# Patient Record
Sex: MALE | Race: BLACK OR AFRICAN AMERICAN | NOT HISPANIC OR LATINO | Employment: UNEMPLOYED | URBAN - METROPOLITAN AREA
[De-identification: names, ages, dates, MRNs, and addresses within clinical notes are randomized per-mention and may not be internally consistent; named-entity substitution may affect disease eponyms.]

---

## 2017-02-10 ENCOUNTER — TRANSFERRED RECORDS (OUTPATIENT)
Dept: HEALTH INFORMATION MANAGEMENT | Facility: CLINIC | Age: 42
End: 2017-02-10

## 2017-02-20 ENCOUNTER — TRANSFERRED RECORDS (OUTPATIENT)
Dept: HEALTH INFORMATION MANAGEMENT | Facility: CLINIC | Age: 42
End: 2017-02-20

## 2017-02-28 ENCOUNTER — PRE VISIT (OUTPATIENT)
Dept: UROLOGY | Facility: CLINIC | Age: 42
End: 2017-02-28

## 2017-04-10 ENCOUNTER — PRE VISIT (OUTPATIENT)
Dept: UROLOGY | Facility: CLINIC | Age: 42
End: 2017-04-10

## 2017-04-14 ENCOUNTER — OFFICE VISIT (OUTPATIENT)
Dept: UROLOGY | Facility: CLINIC | Age: 42
End: 2017-04-14

## 2017-04-14 VITALS
WEIGHT: 168.9 LBS | HEIGHT: 69 IN | HEART RATE: 70 BPM | DIASTOLIC BLOOD PRESSURE: 93 MMHG | BODY MASS INDEX: 25.02 KG/M2 | SYSTOLIC BLOOD PRESSURE: 144 MMHG

## 2017-04-14 DIAGNOSIS — R10.9 FLANK PAIN: Primary | ICD-10-CM

## 2017-04-14 ASSESSMENT — PAIN SCALES - GENERAL: PAINLEVEL: MODERATE PAIN (5)

## 2017-04-14 NOTE — MR AVS SNAPSHOT
After Visit Summary   4/14/2017    Alma Ayala    MRN: [id number]           Patient Information     Date Of Birth          1975        Visit Information        Provider Department      4/14/2017 10:15 AM Xander Rust MD; [employer] [facility] Urology and [facility] for Prostate and Urologic Cancers        Today's Diagnoses     Flank pain    -  1      Care Instructions    Follow up with Dr. Rust in 3 months with renogram.    It was a pleasure meeting with you today.  Thank you for allowing me and my team the privilege of caring for you today.  YOU are the reason we are here, and I truly hope we provided you with the excellent service you deserve.  Please let us know if there is anything else we can do for you so that we can be sure you are leaving completely satisfied with your care experience.      KATHY Szymanski        Follow-ups after your visit        Follow-up notes from your care team     Return in about 1 year (around 4/14/2018).      Future tests that were ordered for you today     Open Future Orders        Priority Expected Expires Ordered    NM Renogram with Lasix Routine 4/15/2017 5/19/2018 4/14/2017            Who to contact     Please call your clinic at [phone] to:    Ask questions about your health    Make or cancel appointments    Discuss your medicines    Learn about your test results    Speak to your doctor   If you have compliments or concerns about an experience at your clinic, or if you wish to file a complaint, please contact [facility] Patient Relations at [phone] or email us at [email or web address]         Additional Information About Your Visit        MyChart Information     Gemmyo is an electronic gateway that provides easy, online access to your medical records. With Gemmyo, you can request a clinic appointment, read your test results, renew a prescription or communicate with your care team.    "  To sign up for [employer] visit the website at [email or web address]   You will be asked to enter the access code listed below, as well as some personal information. Please follow the directions to create your username and password.     Your access code is: [email or web address]  Expires: 2017  7:31 AM     Your access code will  in 90 days. If you need help or a new code, please contact your [facility] or call [phone] for assistance.        Care EveryWhere ID     This is your Care EveryWhere ID. This could be used by other organizations to access your [place] medical records  [id number]        Your Vitals Were     Pulse Height BMI (Body Mass Index)             70 1.753 m (5' 9\") 24.94 kg/m2          Blood Pressure from Last 3 Encounters:   17 (!) 144/93   06/10/16 (!) 149/92   16 166/87    Weight from Last 3 Encounters:   17 76.6 kg (168 lb 14.4 oz)   06/10/16 79.8 kg (176 lb)   16 77.1 kg (170 lb)               Primary Care Provider    None       No address on file        Thank you!     Thank you for choosing [facility] UROLOGY AND [facility] FOR PROSTATE AND UROLOGIC CANCERS  for your care. Our goal is always to provide you with excellent care. Hearing back from our patients is one way we can continue to improve our services. Please take a few minutes to complete the written survey that you may receive in the mail after your visit with us. Thank you!             Your Updated Medication List - Protect others around you: Learn how to safely use, store and throw away your medicines at www.disposemymeds.org.          This list is accurate as of: 17 11:32 AM.  Always use your most recent med list.                   Brand Name Dispense Instructions for use    acetaminophen 325 MG tablet    TYLENOL     Take 1-2 Tabs by mouth every 4 (four) hours as needed for Pain.       CERTAVITE/ANTIOXIDANTS Tabs tablet          clotrimazole 1 % cream    LOTRIMIN    15 " g    Apply topically 2 times daily       diclofenac 75 MG EC tablet    VOLTAREN     Take 75 mg by mouth       GENTEAL MILD TO MODERATE 0.3 % Soln ophthalmic solution   Generic drug:  hypromellose          HYDROcodone-acetaminophen 5-325 MG per tablet    NORCO     Take 1-2 Tabs by mouth every 6 (six) hours as needed for Pain.       ibuprofen 600 MG tablet    ADVIL/MOTRIN     Take 1 Tab by mouth every 6 (six) hours as needed.       * LISINOPRIL PO      Take 20 mg by mouth daily       * lisinopril 40 MG tablet    PRINIVIL/ZESTRIL     Take 1 tablet (40 mg total) by mouth every day Takes in evening Indications: High Blood Pressure       LOSARTAN POTASSIUM PO      Take 25 mg by mouth every evening       omeprazole 20 MG CR capsule    priLOSEC     Take 20 mg by mouth       * senna-docusate 8.6-50 MG per tablet    SENOKOT-S;PERICOLACE    60 tablet    Take 2 tablets by mouth 2 times daily       * DOK PLUS 8.6-50 MG per tablet   Generic drug:  senna-docusate          simvastatin 40 MG tablet    ZOCOR     Take 40 mg by mouth       tamsulosin 0.4 MG capsule    FLOMAX    30 capsule    Take 1 capsule (0.4 mg) by mouth daily       VITAMIN D3 PO      Take 2,000 Units by mouth daily       * Notice:  This list has 4 medication(s) that are the same as other medications prescribed for you. Read the directions carefully, and ask your doctor or other care provider to review them with you.

## 2017-04-14 NOTE — PATIENT INSTRUCTIONS
Follow up with Dr. Rust in 3 months with renogram.    It was a pleasure meeting with you today.  Thank you for allowing me and my team the privilege of caring for you today.  YOU are the reason we are here, and I truly hope we provided you with the excellent service you deserve.  Please let us know if there is anything else we can do for you so that we can be sure you are leaving completely satisfied with your care experience.      KATHY Szymanski

## 2017-04-14 NOTE — LETTER
4/14/2017       RE: Alma Ayala  [address]  [facility] [address]     Dear Colleague,    Thank you for referring your patient, Alma Ayala, to the [facility] UROLOGY AND INST FOR PROSTATE AND UROLOGIC CANCERS at [facility]. Please see a copy of my visit note below.    ASSESSMENT AND PLAN  Status post UPJ obstruction repair.  He was previously planning to transfer his care to [facility] but will plan to follow-up here.    Plan for Lasix Renogram in 3 months.    I recommended antifungal cream for his scrotal itching.  He can purchase this over the counter.  ______________________________________________________________      CHIEF COMPLAINT  History of UPJ obstruction s/p robotic pyeloplasty.    HPI  It was my pleasure to see Alma Ayala who is a 41 year old male here for followup.      His history is R UPJO s/p pyeloplasty (in [place], [place]) and endopyelotomy (Aug 2015).  He had persistent pain and I placed a stent 3/3/16.  At that time the right ureteropelvic junction was open on ureteroscopy.  This stent was removed on 3/11/16.  He had about a month of relief.    He was going to get a second opinion at [place], but from his description, this didn't happen.    He denies recent right flank pain and has been doing well from this standpoint.    His other complaint is some scrotal discomfort.  This is somewhat longstanding and is localized to his skin.    He did have a Lasix Renogram at [facility].  The report is below.  I did not have any images.          Patient Active Problem List    Diagnosis Date Noted     Flank pain 02/21/2016     Priority: Medium     Past Medical History:   Diagnosis Date     Hypertension      Past Surgical History:   Procedure Laterality Date     AS REMOVAL OF KIDNEY STONE       LASER HOLMIUM LITHOTRIPSY URETER(S), INSERT STENT, COMBINED Right 3/3/2016    Procedure: COMBINED CYSTOSCOPY, URETEROSCOPY, LASER HOLMIUM LITHOTRIPSY URETER(S),  "INSERT STENT;  Surgeon: Xander Rust MD;  [facility]     Current Outpatient Prescriptions   Medication Sig Dispense Refill     multivitamin, therapeutic with minerals (CERTAVITE/ANTIOXIDANTS) TABS        hypromellose (GENTEAL MILD TO MODERATE) 0.3 % SOLN        omeprazole (PRILOSEC) 20 MG capsule Take 20 mg by mouth       senna-docusate (DOK PLUS) 8.6-50 MG per tablet        acetaminophen (TYLENOL) 325 MG tablet Take 1-2 Tabs by mouth every 4 (four) hours as needed for Pain.       HYDROcodone-acetaminophen (NORCO) 5-325 MG per tablet Take 1-2 Tabs by mouth every 6 (six) hours as needed for Pain.       ibuprofen (ADVIL,MOTRIN) 600 MG tablet Take 1 Tab by mouth every 6 (six) hours as needed.       lisinopril (PRINIVIL,ZESTRIL) 40 MG tablet Take 1 tablet (40 mg total) by mouth every day Takes in evening Indications: High Blood Pressure       diclofenac (VOLTAREN) 75 MG EC tablet Take 75 mg by mouth       simvastatin (ZOCOR) 40 MG tablet Take 40 mg by mouth       clotrimazole (LOTRIMIN) 1 % cream Apply topically 2 times daily 15 g 1     LISINOPRIL PO Take 20 mg by mouth daily       senna-docusate (SENOKOT-S;PERICOLACE) 8.6-50 MG per tablet Take 2 tablets by mouth 2 times daily 60 tablet 0     tamsulosin (FLOMAX) 0.4 MG 24 hr capsule Take 1 capsule (0.4 mg) by mouth daily 30 capsule 0     Cholecalciferol (VITAMIN D3 PO) Take 2,000 Units by mouth daily        LOSARTAN POTASSIUM PO Take 25 mg by mouth every evening         SOCIAL HISTORY: He  reports that he has never smoked. He has never used smokeless tobacco. He reports that he does not drink alcohol or use illicit drugs.    PHYSICAL EXAM  BP (!) 144/93  Pulse 70  Ht 1.753 m (5' 9\")  Wt 76.6 kg (168 lb 14.4 oz)  BMI 24.94 kg/m2  Constitutional: Alert, no acute distress  Psychiatric: Normal mood and affect  Scrotum shows some flaking of skin and is the site of his pruritis.    CR     0.87   3/3/2016  CR     1.01   3/1/2016    I spent over 15 minutes " with the patient.  Over half this time was spent on counseling for  ureteropelvic junction obstruction.    CC  Patient Care Team:  None as PCP - General  Librado Lombardi MD as MD (Urology)  Xander Rust MD as MD (Urology)  Phyllis Wang, RN as Registered Nurse (Urology)  Self, Referred, MD as Referring Physician  SELF, REFERRED    Copy to patient  ARIANNA WILSON  [address]  [facility] [address]    Again, thank you for allowing me to participate in the care of your patient.      Sincerely,    Xander Rust MD

## 2017-04-14 NOTE — PROGRESS NOTES
ASSESSMENT AND PLAN  Status post UPJ obstruction repair.  He was previously planning to transfer his care to [facility] but will plan to follow-up here.    Plan for Lasix Renogram in 3 months.    I recommended antifungal cream for his scrotal itching.  He can purchase this over the counter.  ______________________________________________________________      CHIEF COMPLAINT  History of UPJ obstruction s/p robotic pyeloplasty.    HPI  It was my pleasure to see Alma Ayala who is a 41 year old male here for followup.      His history is R UPJO s/p pyeloplasty (in [place], [place]) and endopyelotomy (Aug 2015).  He had persistent pain and I placed a stent 3/3/16.  At that time the right ureteropelvic junction was open on ureteroscopy.  This stent was removed on 3/11/16.  He had about a month of relief.    He was going to get a second opinion at [place], but from his description, this didn't happen.    He denies recent right flank pain and has been doing well from this standpoint.    His other complaint is some scrotal discomfort.  This is somewhat longstanding and is localized to his skin.    He did have a Lasix Renogram at [facility].  The report is below.  I did not have any images.          Patient Active Problem List    Diagnosis Date Noted     Flank pain 02/21/2016     Priority: Medium     Past Medical History:   Diagnosis Date     Hypertension      Past Surgical History:   Procedure Laterality Date     AS REMOVAL OF KIDNEY STONE       LASER HOLMIUM LITHOTRIPSY URETER(S), INSERT STENT, COMBINED Right 3/3/2016    Procedure: COMBINED CYSTOSCOPY, URETEROSCOPY, LASER HOLMIUM LITHOTRIPSY URETER(S), INSERT STENT;  Surgeon: Xander Rust MD;  [facility]     Current Outpatient Prescriptions   Medication Sig Dispense Refill     multivitamin, therapeutic with minerals (CERTAVITE/ANTIOXIDANTS) TABS        hypromellose (GENTEAL MILD TO MODERATE) 0.3 % SOLN        omeprazole (PRILOSEC) 20 MG capsule Take 20  "mg by mouth       senna-docusate (DOK PLUS) 8.6-50 MG per tablet        acetaminophen (TYLENOL) 325 MG tablet Take 1-2 Tabs by mouth every 4 (four) hours as needed for Pain.       HYDROcodone-acetaminophen (NORCO) 5-325 MG per tablet Take 1-2 Tabs by mouth every 6 (six) hours as needed for Pain.       ibuprofen (ADVIL,MOTRIN) 600 MG tablet Take 1 Tab by mouth every 6 (six) hours as needed.       lisinopril (PRINIVIL,ZESTRIL) 40 MG tablet Take 1 tablet (40 mg total) by mouth every day Takes in evening Indications: High Blood Pressure       diclofenac (VOLTAREN) 75 MG EC tablet Take 75 mg by mouth       simvastatin (ZOCOR) 40 MG tablet Take 40 mg by mouth       clotrimazole (LOTRIMIN) 1 % cream Apply topically 2 times daily 15 g 1     LISINOPRIL PO Take 20 mg by mouth daily       senna-docusate (SENOKOT-S;PERICOLACE) 8.6-50 MG per tablet Take 2 tablets by mouth 2 times daily 60 tablet 0     tamsulosin (FLOMAX) 0.4 MG 24 hr capsule Take 1 capsule (0.4 mg) by mouth daily 30 capsule 0     Cholecalciferol (VITAMIN D3 PO) Take 2,000 Units by mouth daily        LOSARTAN POTASSIUM PO Take 25 mg by mouth every evening         SOCIAL HISTORY: He  reports that he has never smoked. He has never used smokeless tobacco. He reports that he does not drink alcohol or use illicit drugs.    PHYSICAL EXAM  BP (!) 144/93  Pulse 70  Ht 1.753 m (5' 9\")  Wt 76.6 kg (168 lb 14.4 oz)  BMI 24.94 kg/m2  Constitutional: Alert, no acute distress  Psychiatric: Normal mood and affect  Scrotum shows some flaking of skin and is the site of his pruritis.    CR     0.87   3/3/2016  CR     1.01   3/1/2016    I spent over 15 minutes with the patient.  Over half this time was spent on counseling for  ureteropelvic junction obstruction.    CC  Patient Care Team:  None as PCP - General  Librado Lombardi MD as MD (Urology)  Xander Rust MD as MD (Urology)  Phyllis Wang RN as Registered Nurse (Urology)  Self, Referred, MD as Referring " Physician  SELF, REFERRED    Copy to patient  ARIANNA WILSON  [address]  [facility] [address]

## 2019-10-03 ENCOUNTER — PRE VISIT (OUTPATIENT)
Dept: UROLOGY | Facility: CLINIC | Age: 44
End: 2019-10-03

## 2019-10-03 NOTE — TELEPHONE ENCOUNTER
Reason for Visit: Consult    Diagnosis: pain with ejaculation    Orders/Procedures/Records: in system    Contact Patient: n/a    Rooming Requirements: normal      Meseret Gutierrez LPN  10/03/19  12:36 PM

## 2020-10-02 NOTE — TELEPHONE ENCOUNTER
MEDICAL RECORDS REQUEST   [facility]  Urology Clinic  [address] [place] [address]  PHONE: [phone]  Fax: [phone]        FUTURE VISIT INFORMATION                                                   Alma Ayala, : 1975 scheduled for future visit at [facility]    APPOINTMENT INFORMATION:    Date: 10/21/20 11:15AM    Provider:  Lisbeth Melo MD    Reason for Visit/Diagnosis: Kidney stones     REFERRAL INFORMATION:    Referring provider:  Self    Specialty: N/A    Referring providers clinic:  N/A    Clinic contact number:  N/A    RECORDS REQUESTED FOR VISIT                                                     NOTES  STATUS/DETAILS   OFFICE NOTE from referring provider  no   OFFICE NOTE from other specialist  yes   DISCHARGE SUMMARY from hospital  yes   DISCHARGE REPORT from the ER  yes   OPERATIVE REPORT  yes   MEDICATION LIST  yes   KIDNEY STONE     CT STONE  yes-16   NM RENOGRAM  yes- 17     PRE-VISIT CHECKLIST      Record collection complete Yes- [facility] recs in CE  Internal recs in epic  Images in [facility] PACS   Appointment appropriately scheduled           (right time/right provider) Yes   MyChart activation If no, please explain: In process   Questionnaire complete If no, please explain: In process      Completed by: Vanda Nguyen

## 2020-10-12 ENCOUNTER — APPOINTMENT (OUTPATIENT)
Dept: INTERPRETER SERVICES | Facility: CLINIC | Age: 45
End: 2020-10-12
Payer: COMMERCIAL

## 2020-10-12 ENCOUNTER — TELEPHONE (OUTPATIENT)
Dept: UROLOGY | Facility: CLINIC | Age: 45
End: 2020-10-12

## 2020-10-12 NOTE — TELEPHONE ENCOUNTER
Left message for pt to call back and reschedule appt on 10/21 with Dr. Melo to next available virtual visit with Dr. Rust on 10/30/20 at 12:00PM in held spot. Please remove hold after scheduling.

## 2020-10-12 NOTE — TELEPHONE ENCOUNTER
----- Message from Tamiko Kuo CMA sent at 10/12/2020 11:46 AM CDT -----  Regarding: RE: reschedule to Dr. Rust  Yes- thanks!  ----- Message -----  From: Elisabet Nieto  Sent: 10/12/2020   9:40 AM CDT  To: Tamiko Kuo CMA  Subject: FW: reschedule to Dr. Rust                   Hey can this one be a virtual with Barrera?  ----- Message -----  From: Meseret Gutierrez LPN  Sent: 10/9/2020  11:08 AM CDT  To: Clinic Coordinators[facility]  Subject: reschedule to Dr. Barrera Renee,    This patient is scheduled with Dr. Melo for stones. He originally saw Dr. Rust has he has a history of right UPJO s/p pyeloplasty. They need to follow up with Dr. Rust for this, not Dr. Melo.  Please reschedule.    Thank you.    Meseret Gutierrez LPN

## 2020-10-15 ENCOUNTER — APPOINTMENT (OUTPATIENT)
Dept: INTERPRETER SERVICES | Facility: CLINIC | Age: 45
End: 2020-10-15
Payer: COMMERCIAL

## 2020-10-15 NOTE — TELEPHONE ENCOUNTER
Left message via  informing pt appt on 10/21 was scheduled with incorrect provider. Informed that appt was rescheduled to 11/20 with Dr. Rust. Left scheduling information if pt needs to reschedule. Sent appointment letter.

## 2020-10-21 ENCOUNTER — PRE VISIT (OUTPATIENT)
Dept: UROLOGY | Facility: CLINIC | Age: 45
End: 2020-10-21

## 2020-11-05 ENCOUNTER — PRE VISIT (OUTPATIENT)
Dept: UROLOGY | Facility: CLINIC | Age: 45
End: 2020-11-05

## 2020-11-20 ENCOUNTER — VIRTUAL VISIT (OUTPATIENT)
Dept: UROLOGY | Facility: CLINIC | Age: 45
End: 2020-11-20
Payer: COMMERCIAL

## 2020-11-20 DIAGNOSIS — Z53.9 NO SHOW: Primary | ICD-10-CM

## 2020-11-20 PROBLEM — N13.8 OBSTRUCTIVE NEPHROPATHY: Status: ACTIVE | Noted: 2019-12-12

## 2020-11-20 PROBLEM — I10 ESSENTIAL HYPERTENSION: Status: ACTIVE | Noted: 2019-12-12

## 2020-11-20 PROBLEM — R80.9 PROTEINURIA: Status: ACTIVE | Noted: 2019-12-12

## 2020-11-20 PROBLEM — Z87.442 HISTORY OF RENAL CALCULI: Status: ACTIVE | Noted: 2019-12-12

## 2020-11-20 RX ORDER — ALLOPURINOL 300 MG/1
TABLET ORAL
COMMUNITY
Start: 2020-03-08

## 2020-11-20 RX ORDER — GABAPENTIN 100 MG/1
CAPSULE ORAL
COMMUNITY
Start: 2020-06-10

## 2020-11-20 RX ORDER — LIDOCAINE 50 MG/G
PATCH TOPICAL
COMMUNITY
Start: 2020-06-10

## 2020-11-20 RX ORDER — FAMOTIDINE 40 MG/1
TABLET, FILM COATED ORAL
COMMUNITY
Start: 2020-06-10

## 2020-11-20 RX ORDER — POTASSIUM CITRATE 10 MEQ/1
TABLET, EXTENDED RELEASE ORAL
COMMUNITY
Start: 2020-03-08

## 2020-11-20 RX ORDER — LOSARTAN POTASSIUM 100 MG/1
TABLET ORAL
COMMUNITY
Start: 2020-08-05

## 2020-11-20 RX ORDER — TRIAMTERENE AND HYDROCHLOROTHIAZIDE 37.5; 25 MG/1; MG/1
1 CAPSULE ORAL
COMMUNITY
Start: 2020-10-29 | End: 2021-10-29

## 2020-11-20 RX ORDER — PANTOPRAZOLE SODIUM 40 MG/1
TABLET, DELAYED RELEASE ORAL
COMMUNITY
Start: 2020-04-23

## 2020-11-20 NOTE — LETTER
Date:November 24, 2020      Provider requested that no letter be sent. Do not send.       [facility] Health Information

## 2020-11-20 NOTE — LETTER
11/20/2020       RE: Alma Ayala  [address]  [facility] [address]     Dear Colleague,    Thank you for referring your patient, Alma Ayala, to the [facility] UROLOGY CLINIC [place] at [facility]. Please see a copy of my visit note below.      This patient was a no show for this scheduled appointment.      Again, thank you for allowing me to participate in the care of your patient.      Sincerely,    Xander Rust MD

## 2020-11-20 NOTE — PROGRESS NOTES
"Video Visit Technology for this patient: [employer] Video Visit- Please send an invite to patient's cell phone [phone].  Please use  Services at [phone].     Alma Ayala is a 45 year old male who is being evaluated via a billable video visit.      The patient has been notified of following:     \"This video visit will be conducted via a call between you and your physician/provider. We have found that certain health care needs can be provided without the need for an in-person physical exam.  This service lets us provide the care you need with a video conversation.  If a prescription is necessary we can send it directly to your pharmacy.  If lab work is needed we can place an order for that and you can then stop by our lab to have the test done at a later time.    Video visits are billed at different rates depending on your insurance coverage.  Please reach out to your insurance provider with any questions.    If during the course of the call the physician/provider feels a video visit is not appropriate, you will not be charged for this service.\"    Patient has given verbal consent for Video visit? Yes  How would you like to obtain your AVS? Mail a copy  If you are dropped from the video visit, the video invite should be resent to: Text to cell phone: [phone]  Will anyone else be joining your video visit? No  {If patient encounters technical issues they should call [phone] :[id number]}      Video-Visit Details    Type of service:  Video Visit    Video Start Time: {video visit start/end time:152948}  Video End Time: {video visit start/end time:152948}    Originating Location (pt. Location): {video visit patient location:111936::\"Home\"}    Distant Location (provider location):  [facility] UROLOGY [facility]     Platform used for Video Visit: {Virtual Visit Platforms:537147::\"Hoopla\"}    {signature options:343849}              "

## 2021-04-23 ENCOUNTER — VIRTUAL VISIT (OUTPATIENT)
Dept: UROLOGY | Facility: CLINIC | Age: 46
End: 2021-04-23
Payer: COMMERCIAL

## 2021-04-23 DIAGNOSIS — R10.9 RIGHT FLANK PAIN: Primary | ICD-10-CM

## 2021-04-23 PROCEDURE — 99203 OFFICE O/P NEW LOW 30 MIN: CPT | Mod: 95 | Performed by: UROLOGY

## 2021-04-23 ASSESSMENT — PAIN SCALES - GENERAL: PAINLEVEL: NO PAIN (0)

## 2021-04-23 NOTE — PROGRESS NOTES
"Video Visit Technology for this patient: Anitha Video Visit- Please send an invite to patient's [phone]   Alma is a 46 year old who is being evaluated via a billable video visit.      How would you like to obtain your AVS? Mail a copy  If the video visit is dropped, the invitation should be resent by: Text to cell phone: [phone]  Will anyone else be joining your video visit? No  {If patient encounters technical issues they should call [phone] :[id number]}    Video Start Time: {video visit start/end time for provider to select:183193}  Video-Visit Details    Type of service:  Video Visit    Video End Time:{video visit start/end time for provider to select:402725}    Originating Location (pt. Location): {video visit patient location:100690::\"Home\"}    Distant Location (provider location):  [facility] UROLOGY [facility]     Platform used for Video Visit: {Virtual Visit Platforms:388289::\"SherrieWell\"}  "

## 2021-04-23 NOTE — LETTER
4/23/2021       RE: Alma Ayala  [address]  [facility] [address]     Dear Colleague,    Thank you for referring your patient, Alma Ayala, to the [facility] UROLOGY CLINIC [place] at [facility]. Please see a copy of my visit note below.    ASSESSMENT AND PLAN  Status post right UPJ obstruction repair with 2015 pyeloplasty and 2016 endopyelotomy.    Recent increase in right flank pain.    Plan    Lasix Renogram    CT Abdomen/Pelvis without contrast    Urinalysis and urine culture    Basic metabolic panel.      ______________________________________________________________        HPI  It was my pleasure to see Alma Ayala who is a 46 year old male here for followup.       His history is R UPJO s/p pyeloplasty (in [place], [place]) and endopyelotomy (Aug 2015).  He had persistent pain and I placed a stent 3/3/16.  At that time the right ureteropelvic junction was open on ureteroscopy.  This stent was removed on 3/11/16.  He had about a month of relief.     He was going to get a second opinion at [place], but from his description, this didn't happen.      4/23/20 He returns today for right flank and testicle pain.  This pain has been present for 20 years.  It is worsening recently.  His previous visit was 4/14/17.     2/20/17 He did have a Lasix Renogram at [facility].               3/22/21 Scrotal U/S  1.  Normal appearance of the bilateral testicles.  2.  Bilateral epididymal head cysts.    Again, thank you for allowing me to participate in the care of your patient.      Sincerely,    Xander Rust MD

## 2021-04-23 NOTE — PROGRESS NOTES
ASSESSMENT AND PLAN  Status post right UPJ obstruction repair with 2015 pyeloplasty and 2016 endopyelotomy.    Recent increase in right flank pain.    Plan    Lasix Renogram    CT Abdomen/Pelvis without contrast    Urinalysis and urine culture    Basic metabolic panel.      ______________________________________________________________        HPI  It was my pleasure to see Alma Ayala who is a 46 year old male here for followup.       His history is R UPJO s/p pyeloplasty (in [place], [place]) and endopyelotomy (Aug 2015).  He had persistent pain and I placed a stent 3/3/16.  At that time the right ureteropelvic junction was open on ureteroscopy.  This stent was removed on 3/11/16.  He had about a month of relief.     He was going to get a second opinion at [place], but from his description, this didn't happen.      4/23/20 He returns today for right flank and testicle pain.  This pain has been present for 20 years.  It is worsening recently.  His previous visit was 4/14/17.     2/20/17 He did have a Lasix Renogram at [facility].               3/22/21 Scrotal U/S  1.  Normal appearance of the bilateral testicles.  2.  Bilateral epididymal head cysts.

## 2021-04-23 NOTE — PATIENT INSTRUCTIONS
Please make a appointment for labs and imaging     It was a pleasure meeting with you today.  Thank you for allowing me and my team the privilege of caring for you today.  YOU are the reason we are here, and I truly hope we provided you with the excellent service you deserve.  Please let us know if there is anything else we can do for you so that we can be sure you are leaving completely satisfied with your care experience.

## 2021-05-24 ENCOUNTER — HOSPITAL ENCOUNTER (OUTPATIENT)
Dept: NUCLEAR MEDICINE | Facility: CLINIC | Age: 46
Setting detail: NUCLEAR MEDICINE
Discharge: HOME | End: 2021-05-24
Attending: UROLOGY | Admitting: UROLOGY
Payer: COMMERCIAL

## 2021-05-24 ENCOUNTER — HOSPITAL ENCOUNTER (OUTPATIENT)
Dept: CT IMAGING | Facility: CLINIC | Age: 46
Discharge: HOME | End: 2021-05-24
Attending: UROLOGY | Admitting: UROLOGY
Payer: COMMERCIAL

## 2021-05-24 DIAGNOSIS — R10.9 RIGHT FLANK PAIN: ICD-10-CM

## 2021-05-24 LAB
ALBUMIN UR-MCNC: NEGATIVE MG/DL
APPEARANCE UR: CLEAR
BILIRUB UR QL STRIP: NEGATIVE
COLOR UR AUTO: ABNORMAL
GLUCOSE UR STRIP-MCNC: NEGATIVE MG/DL
HGB UR QL STRIP: ABNORMAL
KETONES UR STRIP-MCNC: NEGATIVE MG/DL
LEUKOCYTE ESTERASE UR QL STRIP: ABNORMAL
NITRATE UR QL: NEGATIVE
PH UR STRIP: 5 PH (ref 5–7)
RBC #/AREA URNS AUTO: 1 /HPF (ref 0–2)
SOURCE: ABNORMAL
SP GR UR STRIP: 1.01 (ref 1–1.03)
UROBILINOGEN UR STRIP-MCNC: 0 MG/DL (ref 0–2)
WBC #/AREA URNS AUTO: 8 /HPF (ref 0–5)

## 2021-05-24 PROCEDURE — 250N000011 HC RX IP 250 OP 636: Performed by: UROLOGY

## 2021-05-24 PROCEDURE — 78708 K FLOW/FUNCT IMAGE W/DRUG: CPT | Mod: 26

## 2021-05-24 PROCEDURE — 81001 URINALYSIS AUTO W/SCOPE: CPT | Performed by: PATHOLOGY

## 2021-05-24 PROCEDURE — 74176 CT ABD & PELVIS W/O CONTRAST: CPT | Mod: 26 | Performed by: STUDENT IN AN ORGANIZED HEALTH CARE EDUCATION/TRAINING PROGRAM

## 2021-05-24 PROCEDURE — 74176 CT ABD & PELVIS W/O CONTRAST: CPT

## 2021-05-24 PROCEDURE — 78708 K FLOW/FUNCT IMAGE W/DRUG: CPT

## 2021-05-24 PROCEDURE — A9562 TC99M MERTIATIDE: HCPCS | Performed by: UROLOGY

## 2021-05-24 PROCEDURE — 343N000001 HC RX 343: Performed by: UROLOGY

## 2021-05-24 RX ORDER — FUROSEMIDE 10 MG/ML
20-40 INJECTION INTRAMUSCULAR; INTRAVENOUS ONCE
Status: COMPLETED | OUTPATIENT
Start: 2021-05-24 | End: 2021-05-24

## 2021-05-24 RX ADMIN — TECHNESCAN TC 99M MERTIATIDE 9.5 MILLICURIE: 1 INJECTION, POWDER, LYOPHILIZED, FOR SOLUTION INTRAVENOUS at 10:54

## 2021-05-24 RX ADMIN — FUROSEMIDE 20 MG: 10 INJECTION, SOLUTION INTRAVENOUS at 10:56

## 2021-06-11 ENCOUNTER — TELEPHONE (OUTPATIENT)
Dept: UROLOGY | Facility: CLINIC | Age: 46
End: 2021-06-11

## 2021-06-11 NOTE — TELEPHONE ENCOUNTER
[facility] Call Center    Phone Message    May a detailed message be left on voicemail: yes     Reason for Call: Other: Pt would like a call back to discuss his lab results     Action Taken: Message routed to:  [facility] ([facility] Uro    Travel Screening: Not Applicable

## 2021-06-15 NOTE — TELEPHONE ENCOUNTER
[facility]    Phone Message    May a detailed message be left on voicemail: yes     Reason for Call: Requesting Results   Name/type of test: UA with Microscopic reflex to Culture  Date of test: 5/24/2021  Was test done at a location other than [facility] (Please fill in the location if not [facility])?: No      Action Taken: Message routed to:  [facility] ([facility]): ump uro    Travel Screening: Not Applicable

## 2021-06-15 NOTE — TELEPHONE ENCOUNTER
Contacted patient with  services to discuss. He would like to know what Dr. Rust will do about his stones. Will contact provider.  Meseret Gutierrez LPN  Urology Clinic Service   [facility]

## 2021-06-22 ENCOUNTER — PREP FOR PROCEDURE (OUTPATIENT)
Dept: SURGERY | Facility: CLINIC | Age: 46
End: 2021-06-22

## 2021-06-22 DIAGNOSIS — N20.0 KIDNEY STONE: Primary | ICD-10-CM

## 2021-06-23 ENCOUNTER — APPOINTMENT (OUTPATIENT)
Dept: INTERPRETER SERVICES | Facility: CLINIC | Age: 46
End: 2021-06-23
Payer: COMMERCIAL

## 2021-06-23 NOTE — TELEPHONE ENCOUNTER
Barrera, Xander Wallace MD  You 15 hours ago (9:20 PM)     I put in surgery orders for removal of the right kidney stone.  He has stones on the left as well.  If he wants to discuss, we can set up an appointment.  Thanks, Rodrigo.      [facility] for patient with  services with above information.  Meseret Gutierrez LPN  Urology Clinic Service   [facility]

## 2021-06-24 ENCOUNTER — TELEPHONE (OUTPATIENT)
Dept: UROLOGY | Facility: CLINIC | Age: 46
End: 2021-06-24

## 2021-06-24 ENCOUNTER — HOSPITAL ENCOUNTER (OUTPATIENT)
Facility: CLINIC | Age: 46
End: 2021-06-24
Attending: UROLOGY | Admitting: UROLOGY
Payer: COMMERCIAL

## 2021-06-24 ENCOUNTER — APPOINTMENT (OUTPATIENT)
Dept: INTERPRETER SERVICES | Facility: CLINIC | Age: 46
End: 2021-06-24
Payer: COMMERCIAL

## 2021-06-24 DIAGNOSIS — N20.0 KIDNEY STONE: ICD-10-CM

## 2021-06-24 DIAGNOSIS — Z11.59 ENCOUNTER FOR SCREENING FOR OTHER VIRAL DISEASES: ICD-10-CM

## 2021-06-24 NOTE — CONFIDENTIAL NOTE
Patient called back to postpone surgery with Dr Rust on 7/5/21 @ [facility] OR at this time due to a family medical emergency in [place]. Patient will call to reschedule surgery upon his return from [place].

## 2021-07-05 ENCOUNTER — PRE VISIT (OUTPATIENT)
Dept: UROLOGY | Facility: CLINIC | Age: 46
End: 2021-07-05

## 2021-07-05 NOTE — TELEPHONE ENCOUNTER
Reason for visit: Cystoscopy     Relevant information: Right flank pain, kidney stones    Records/imaging/labs/orders: In Epic    Pt called: No     At Rooming: Urine sample

## 2023-08-05 ENCOUNTER — HOSPITAL ENCOUNTER (EMERGENCY)
Facility: CLINIC | Age: 48
Discharge: HOME | End: 2023-08-06
Attending: EMERGENCY MEDICINE | Admitting: EMERGENCY MEDICINE
Payer: COMMERCIAL

## 2023-08-05 VITALS
OXYGEN SATURATION: 99 % | RESPIRATION RATE: 16 BRPM | WEIGHT: 180 LBS | SYSTOLIC BLOOD PRESSURE: 133 MMHG | HEART RATE: 63 BPM | TEMPERATURE: 97.5 F | DIASTOLIC BLOOD PRESSURE: 77 MMHG | BODY MASS INDEX: 26.58 KG/M2

## 2023-08-05 DIAGNOSIS — G89.29 CHRONIC BILATERAL LOW BACK PAIN WITHOUT SCIATICA: ICD-10-CM

## 2023-08-05 DIAGNOSIS — M54.50 CHRONIC BILATERAL LOW BACK PAIN WITHOUT SCIATICA: ICD-10-CM

## 2023-08-05 PROCEDURE — 99283 EMERGENCY DEPT VISIT LOW MDM: CPT | Performed by: EMERGENCY MEDICINE

## 2023-08-05 PROCEDURE — 99284 EMERGENCY DEPT VISIT MOD MDM: CPT | Performed by: EMERGENCY MEDICINE

## 2023-08-05 ASSESSMENT — ACTIVITIES OF DAILY LIVING (ADL): ADLS_ACUITY_SCORE: 33

## 2023-08-06 PROCEDURE — 250N000013 HC RX MED GY IP 250 OP 250 PS 637: Performed by: EMERGENCY MEDICINE

## 2023-08-06 RX ORDER — HYDROCODONE BITARTRATE AND ACETAMINOPHEN 5; 325 MG/1; MG/1
1 TABLET ORAL ONCE
Status: COMPLETED | OUTPATIENT
Start: 2023-08-06 | End: 2023-08-06

## 2023-08-06 RX ORDER — METHOCARBAMOL 500 MG/1
500-1000 TABLET, FILM COATED ORAL 3 TIMES DAILY PRN
Qty: 20 TABLET | Refills: 0 | Status: SHIPPED | OUTPATIENT
Start: 2023-08-06

## 2023-08-06 RX ORDER — IBUPROFEN 600 MG/1
600 TABLET, FILM COATED ORAL EVERY 6 HOURS PRN
Qty: 30 TABLET | Refills: 0 | Status: SHIPPED | OUTPATIENT
Start: 2023-08-06

## 2023-08-06 RX ORDER — IBUPROFEN 600 MG/1
600 TABLET, FILM COATED ORAL ONCE
Status: COMPLETED | OUTPATIENT
Start: 2023-08-06 | End: 2023-08-06

## 2023-08-06 RX ADMIN — IBUPROFEN 600 MG: 600 TABLET, FILM COATED ORAL at 00:51

## 2023-08-06 RX ADMIN — HYDROCODONE BITARTRATE AND ACETAMINOPHEN 1 TABLET: 5; 325 TABLET ORAL at 00:51

## 2023-08-06 NOTE — ED PROVIDER NOTES
ED Provider Note  [facility]      History     Chief Complaint   Patient presents with    Back Pain     Acute on chronic right lower back pain that radiates laterally down right leg and up into the back and back of the head, states he has had this pain for 12-15 years, denies any recent trauma, has also had tingling in the legs for the same amount of time     HPI  Alma Ayala is a 48 year old male with a history of chronic back pain evaluated by MRI in March of last year with below results who presents to the emergency department with worsened pain for the past few days.  Patient complains of pain in the bilateral low back, right greater than left, that radiates in all directions with movement.  He denies any radiculopathy.  No leg weakness.  No bowel or bladder dysfunction.  No abdominal pain.  No fever.  No recent injury.  He has been taking acetaminophen for pain.  He was previously prescribed ibuprofen and gabapentin but states he is not currently taking those.    Procedure Note    Ameya Alejandre,  - 03/08/2022  Formatting of this note might be different from the original.  For Patients:  As a result of the 21st Century Cures Act, medical imaging exams and procedure reports are released immediately into your electronic medical record.  You may view this report before your referring provider.  If you have questions, please contact your health care provider.      Indication:  Lumbar spine pain.    Technique:  T2, T1, and STIR sagittal as well as T1 and T2 axial sequences were obtained.  No IV contrast.    Comparison:  None relevant available.    Findings:  There are 5 lumbar type vertebral segments identified. The vertebral body heights are maintained without evidence of fracture. No marrow infiltrative process. No significant spondylolisthesis.    The conus medullaris terminates at L1-2, normal. Cauda equina appears unremarkable.    T12-L1: No spinal canal or neural  foraminal stenosis.    L1-2: No spinal canal or neural foraminal stenosis.    L2-3: No spinal canal or neural foraminal stenosis.    L3-4:  No spinal canal or neural foraminal stenosis.    L4-5: Mild disc height loss and desiccation. Disc bulge, with small central disc protrusion without spinal canal or neural foraminal narrowing. Mild facet arthropathy.    L5-S1:  Mild disc height loss and desiccation. Mild disc bulge. There is superimposed central and left subarticular protrusion which abuts the descending left greater than right S1 nerve roots. The neural foramina are patent. Mild facet arthropathy.    Multiple peripelvic renal cysts noted.    Impression:  1. At L5-S1, central and left subarticular disc protrusion abutting the left greater than right descending left S1 nerve roots.  2. At L4-5, small central disc protrusion without spinal canal or neural foraminal narrowing.  3. The remainder of the lumbar spine appears unremarkable.     MR Sacrum and Coccyx w/o Contrast  Order: [id number]  Narrative    For Patients:  As a result of the 21st Century Cures Act, medical imaging exams and procedure reports are released immediately into your electronic medical record.  You may view this report before your referring provider.  If you have questions, please contact your health care provider.      HISTORY:  Low back pain.    TECHNIQUE:  MRI sacrum and coccyx without contrast.    COMPARISON:  None.    FINDINGS:  Sacroiliac joints: Sacroiliac joints are maintained. No periarticular marrow edema or erosions at the sacroiliac joints. No sacroiliac joint ankylosis. No sacroiliac joint effusions.    Bones: No fracture. No marrow edema. No marrow replacing process. Small benign cyst-like focus at the right femoral head neck junction. Sacral neural foramina are patent.    Musculature: No muscle edema. Piriformis muscles are symmetric.    Other: Shallow disc protrusion at L5-S1. Visualized intrapelvic structures are  unremarkable.    Impression:  1. Shallow disc protrusion at L5-S1.  2. Otherwise unremarkable MRI of the sacrum and coccyx.    Past Medical History  Past Medical History:   Diagnosis Date    Hypertension      Past Surgical History:   Procedure Laterality Date    AS REMOVAL OF KIDNEY STONE      LASER HOLMIUM LITHOTRIPSY URETER(S), INSERT STENT, COMBINED Right 3/3/2016    Procedure: COMBINED CYSTOSCOPY, URETEROSCOPY, LASER HOLMIUM LITHOTRIPSY URETER(S), INSERT STENT;  Surgeon: Xander Rust MD;  Location: [facility] OR     ibuprofen (ADVIL/MOTRIN) 600 MG tablet  methocarbamol (ROBAXIN) 500 MG tablet  acetaminophen (TYLENOL) 325 MG tablet  allopurinol (ZYLOPRIM) 300 MG tablet  Cholecalciferol (VITAMIN D3 PO)  clotrimazole (LOTRIMIN) 1 % cream  diclofenac (VOLTAREN) 75 MG EC tablet  famotidine (PEPCID) 40 MG tablet  gabapentin (NEURONTIN) 100 MG capsule  HYDROcodone-acetaminophen (NORCO) 5-325 MG per tablet  hypromellose (GENTEAL MILD TO MODERATE) 0.3 % SOLN  lidocaine (LIDODERM) 5 % patch  lisinopril (PRINIVIL,ZESTRIL) 40 MG tablet  LISINOPRIL PO  losartan (COZAAR) 100 MG tablet  multivitamin, therapeutic with minerals (CERTAVITE/ANTIOXIDANTS) TABS  omeprazole (PRILOSEC) 20 MG capsule  pantoprazole (PROTONIX) 40 MG EC tablet  potassium citrate (UROCIT-K) 10 MEQ (1080 MG) CR tablet  senna-docusate (DOK PLUS) 8.6-50 MG per tablet  senna-docusate (SENOKOT-S;PERICOLACE) 8.6-50 MG per tablet  simvastatin (ZOCOR) 40 MG tablet  tamsulosin (FLOMAX) 0.4 MG 24 hr capsule  triamterene-HCTZ (DYAZIDE) 37.5-25 MG capsule      No Known Allergies  Family History  No family history on file.  Social History   Social History     Tobacco Use    Smoking status: Never    Smokeless tobacco: Never   Substance Use Topics    Alcohol use: No     Alcohol/week: 0.0 standard drinks of alcohol    Drug use: No         A medically appropriate review of systems was performed with pertinent positives and negatives noted in the HPI, and all other systems  negative.    Physical Exam   BP: 133/77  Pulse: 63  Temp: 97.5  F (36.4  C)  Resp: 16  Weight: 81.6 kg (180 lb)  SpO2: 99 %  Physical Exam  Vitals and nursing note reviewed.   Constitutional:       General: He is not in acute distress.     Appearance: Normal appearance. He is not diaphoretic.   HENT:      Head: Normocephalic and atraumatic.   Eyes:      General: No scleral icterus.     Pupils: Pupils are equal, round, and reactive to light.   Cardiovascular:      Rate and Rhythm: Normal rate and regular rhythm.      Pulses: Normal pulses.      Heart sounds: Normal heart sounds.   Pulmonary:      Effort: Pulmonary effort is normal. No respiratory distress.      Breath sounds: Normal breath sounds.   Abdominal:      General: Bowel sounds are normal.      Palpations: Abdomen is soft.      Tenderness: There is no abdominal tenderness.   Musculoskeletal:         General: No tenderness.      Cervical back: Normal and normal range of motion.      Thoracic back: Normal.      Lumbar back: Spasms (Right lumbar paraspinals) present. Decreased range of motion. Negative right straight leg raise test and negative left straight leg raise test.   Skin:     General: Skin is warm and dry.      Capillary Refill: Capillary refill takes less than 2 seconds.      Findings: No rash.   Neurological:      General: No focal deficit present.      Mental Status: He is alert.      Sensory: No sensory deficit.      Motor: No weakness.      Coordination: Coordination normal.      Gait: Gait normal.           ED Course, Procedures, & Data      Procedures                      No results found for any visits on 08/05/23.  Medications   ibuprofen (ADVIL/MOTRIN) tablet 600 mg (600 mg Oral $Given 8/6/23 0051)   HYDROcodone-acetaminophen (NORCO) 5-325 MG per tablet 1 tablet (1 tablet Oral $Given 8/6/23 0051)     Labs Ordered and Resulted from Time of ED Arrival to Time of ED Departure - No data to display  No orders to display          Critical care was  not performed.     Medical Decision Making  The patient's presentation was of moderate complexity (a chronic illness mild to moderate exacerbation, progression, or side effect of treatment).    The patient's evaluation involved:  review of 1 test result(s) ordered prior to this encounter (see separate area of note for details)    The patient's management necessitated moderate risk (prescription drug management including medications given in the ED).    Assessment & Plan    48 year old male with history of chronic back pain and L5/S1 disc protrusion to the emergency department with worsening symptoms.  He does not have any clinical signs or symptoms for cauda equina syndrome.  He does not have any weakness on physical examination.  He has not had any infectious symptoms.  He has not been taking anything other than acetaminophen for his discomfort.  He appears clinically well but is experiencing some discomfort here in the emergency department.  He was given ibuprofen and Norco here in the emergency department.  He has undergone MRI imaging previously I do not feel that repeat imaging would be helpful or indicated tonight.  Suspect ongoing pain from already identified lumbar disc disease cause.  He does not have significant radiculopathy at this time so do not feel that Medrol Dosepak is indicated.  We will discharge to home with ibuprofen and Robaxin.  Primary care follow-up recommended.  Return precautions provided.    I have reviewed the nursing notes. I have reviewed the findings, diagnosis, plan and need for follow up with the patient.    Discharge Medication List as of 8/6/2023 12:52 AM        START taking these medications    Details   methocarbamol (ROBAXIN) 500 MG tablet Take 1-2 tablets (500-1,000 mg) by mouth 3 times daily as needed for muscle spasms, Disp-20 tablet, R-0, E-Prescribe             Final diagnoses:   Chronic bilateral low back pain without sciatica     Chart documentation was completed with  Dragon voice-recognition software. Even though reviewed, this chart may still contain some grammatical, spelling, and word errors.     Neel Mckeon Md  [facility], located within [facility] EMERGENCY DEPARTMENT  8/5/2023     Neel Mckeon MD  08/06/23 0420

## 2023-08-06 NOTE — DISCHARGE INSTRUCTIONS
Take ibuprofen for pain.  Take Robaxin as directed for muscle spasm.    Follow-up with your primary care clinic.    Return to the emergency department if fever, difficulty with bowel or bladder control, or other concerns.

## 2023-08-06 NOTE — ED TRIAGE NOTES
Triage Assessment       Row Name 08/05/23 5118       Triage Assessment (Adult)    Airway WDL WDL       Respiratory WDL    Respiratory WDL WDL       Skin Circulation/Temperature WDL    Skin Circulation/Temperature WDL WDL       Cardiac WDL    Cardiac WDL WDL       Peripheral/Neurovascular WDL    Peripheral Neurovascular WDL WDL       Cognitive/Neuro/Behavioral WDL    Cognitive/Neuro/Behavioral WDL WDL

## 2024-05-04 ENCOUNTER — HEALTH MAINTENANCE LETTER (OUTPATIENT)
Age: 49
End: 2024-05-04

## 2025-05-17 ENCOUNTER — HEALTH MAINTENANCE LETTER (OUTPATIENT)
Age: 50
End: 2025-05-17

## (undated) RX ORDER — FUROSEMIDE 10 MG/ML
INJECTION INTRAMUSCULAR; INTRAVENOUS
Status: DISPENSED
Start: 2021-05-24